# Patient Record
Sex: FEMALE | Race: BLACK OR AFRICAN AMERICAN
[De-identification: names, ages, dates, MRNs, and addresses within clinical notes are randomized per-mention and may not be internally consistent; named-entity substitution may affect disease eponyms.]

---

## 2020-06-23 ENCOUNTER — HOSPITAL ENCOUNTER (OUTPATIENT)
Dept: HOSPITAL 92 - BICULT | Age: 25
Discharge: HOME | End: 2020-06-23
Attending: OBSTETRICS & GYNECOLOGY
Payer: COMMERCIAL

## 2020-06-23 DIAGNOSIS — Z3A.21: ICD-10-CM

## 2020-06-23 DIAGNOSIS — Z34.82: Primary | ICD-10-CM

## 2020-06-23 PROCEDURE — 76805 OB US >/= 14 WKS SNGL FETUS: CPT

## 2020-06-23 NOTE — ULT
ULTRASOUND OBSTETRICAL COMPLETE:



DATE:

6/23/2020



HISTORY:

ICD-10: "Z 34.82 encounter for supervision of other normal pregnancy, second trimester"



FINDINGS:



Fetal number: morris

Fetal lie: Cephalic

Maternal cervix: 3.5 cm.  Closed.

Placenta: Anterior. No placenta previa. Placental lakes are noted.

Amniotic fluid volume: GALLITO = 13 cm

Fetal heart rate: 134 bpm



The following fetal anatomy is visualized, with no evidence of anomalies:



Head, cerebellum, lateral ventricles, four-chamber heart, stomach, kidneys, cord insertion, bladder, 
cervical spine, thoracic spine, lumbar spine, sacrum, nose and lips, upper extremities, lower

extremities, and three-vessel cord.



Fetal biometry:



Biparietal diameter (BPD): 5.0 cm   21 w    1 d

Head circumference (HC): 18.9 cm    21  w     2 d

Abdominal circumference (AC): 15.4 cm   20 w    5 d

Femur length (FL): 3.8 cm   22 w     1  d





Average ultrasound age (AUA):  21 w    3 d

Estimated date of delivery (CINTIA): 10/31/2020

Estimated fetal weight (EFW): 410 g +/- 60 g 



IMPRESSION:

1) Live 2nd  trimester intrauterine gestation.

2) Estimated gestational age of  21 weeks,  3 days

3) cephalic lie.

4) no fetal anatomic abnormality identified.



Reported By: Kristian Prakash 

Electronically Signed:  6/23/2020 2:31 PM

## 2022-06-02 ENCOUNTER — HOSPITAL ENCOUNTER (EMERGENCY)
Dept: HOSPITAL 92 - ERS | Age: 27
LOS: 1 days | Discharge: HOME | End: 2022-06-03
Payer: COMMERCIAL

## 2022-06-02 DIAGNOSIS — Z3A.20: ICD-10-CM

## 2022-06-02 DIAGNOSIS — O99.012: ICD-10-CM
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
## 2022-06-02 DIAGNOSIS — D57.1: ICD-10-CM

## 2022-06-02 DIAGNOSIS — S02.609A: ICD-10-CM

## 2022-06-02 DIAGNOSIS — O9A.212: Primary | ICD-10-CM

## 2022-06-02 PROCEDURE — 86901 BLOOD TYPING SEROLOGIC RH(D): CPT

## 2022-06-02 PROCEDURE — 70110 X-RAY EXAM OF JAW 4/> VIEWS: CPT

## 2022-06-02 PROCEDURE — 86900 BLOOD TYPING SEROLOGIC ABO: CPT

## 2022-06-02 PROCEDURE — 36415 COLL VENOUS BLD VENIPUNCTURE: CPT

## 2022-06-02 PROCEDURE — 99283 EMERGENCY DEPT VISIT LOW MDM: CPT

## 2022-06-02 PROCEDURE — 85025 COMPLETE CBC W/AUTO DIFF WBC: CPT

## 2022-06-03 LAB
BASOPHILS # BLD AUTO: 0 THOU/UL (ref 0–0.2)
BASOPHILS NFR BLD AUTO: 0.5 % (ref 0–1)
EOSINOPHIL # BLD AUTO: 0.2 THOU/UL (ref 0–0.7)
EOSINOPHIL NFR BLD AUTO: 2.7 % (ref 0–10)
HGB BLD-MCNC: 8.9 G/DL (ref 12–16)
LYMPHOCYTES # BLD: 2.2 THOU/UL (ref 1.2–3.4)
LYMPHOCYTES NFR BLD AUTO: 32.4 % (ref 21–51)
MCH RBC QN AUTO: 25.5 PG (ref 27–31)
MCV RBC AUTO: 80.1 FL (ref 78–98)
MONOCYTES # BLD AUTO: 0.7 THOU/UL (ref 0.11–0.59)
MONOCYTES NFR BLD AUTO: 9.8 % (ref 0–10)
NEUTROPHILS # BLD AUTO: 3.7 THOU/UL (ref 1.4–6.5)
NEUTROPHILS NFR BLD AUTO: 54.5 % (ref 42–75)
PLATELET # BLD AUTO: 225 THOU/UL (ref 130–400)
RBC # BLD AUTO: 3.49 MILL/UL (ref 4.2–5.4)
WBC # BLD AUTO: 6.9 THOU/UL (ref 4.8–10.8)

## 2022-06-14 ENCOUNTER — HOSPITAL ENCOUNTER (OUTPATIENT)
Dept: HOSPITAL 92 - BICULT | Age: 27
Discharge: HOME | End: 2022-06-14
Attending: OBSTETRICS & GYNECOLOGY
Payer: COMMERCIAL

## 2022-06-14 DIAGNOSIS — Z3A.21: ICD-10-CM

## 2022-06-14 DIAGNOSIS — Z34.82: Primary | ICD-10-CM

## 2022-06-14 PROCEDURE — 76805 OB US >/= 14 WKS SNGL FETUS: CPT

## 2022-10-13 ENCOUNTER — HOSPITAL ENCOUNTER (INPATIENT)
Dept: HOSPITAL 92 - CSHLD/OP | Age: 27
LOS: 2 days | Discharge: HOME | End: 2022-10-15
Attending: OBSTETRICS & GYNECOLOGY | Admitting: OBSTETRICS & GYNECOLOGY
Payer: COMMERCIAL

## 2022-10-13 VITALS — BODY MASS INDEX: 22.8 KG/M2

## 2022-10-13 DIAGNOSIS — O32.8XX0: Primary | ICD-10-CM

## 2022-10-13 DIAGNOSIS — Z3A.39: ICD-10-CM

## 2022-10-13 DIAGNOSIS — Z79.899: ICD-10-CM

## 2022-10-13 LAB
HBSAG INDEX: 0.19 S/CO (ref 0–0.99)
HGB BLD-MCNC: 9.8 G/DL (ref 12–15.5)
HGB BLD-MCNC: 9.9 G/DL (ref 12–15.5)
MCH RBC QN AUTO: 24.1 PG (ref 27–33)
MCH RBC QN AUTO: 25.4 PG (ref 27–33)
MCV RBC AUTO: 78.1 FL (ref 81.6–98.3)
MCV RBC AUTO: 78.4 FL (ref 81.6–98.3)
PLATELET # BLD AUTO: 164 10X3/UL (ref 150–450)
PLATELET # BLD AUTO: 207 10X3/UL (ref 150–450)
RBC # BLD AUTO: 3.89 10X6/UL (ref 3.9–5.03)
RBC # BLD AUTO: 4.06 10X6/UL (ref 3.9–5.03)
SYPHILIS ANTIBODY INDEX: 0.05 S/CO
WBC # BLD AUTO: 12.1 10X3/UL (ref 3.5–10.5)
WBC # BLD AUTO: 6.8 10X3/UL (ref 3.5–10.5)

## 2022-10-13 PROCEDURE — 36430 TRANSFUSION BLD/BLD COMPNT: CPT

## 2022-10-13 PROCEDURE — 51702 INSERT TEMP BLADDER CATH: CPT

## 2022-10-13 PROCEDURE — 85027 COMPLETE CBC AUTOMATED: CPT

## 2022-10-13 PROCEDURE — P9016 RBC LEUKOCYTES REDUCED: HCPCS

## 2022-10-13 PROCEDURE — 30233N1 TRANSFUSION OF NONAUTOLOGOUS RED BLOOD CELLS INTO PERIPHERAL VEIN, PERCUTANEOUS APPROACH: ICD-10-PCS | Performed by: OBSTETRICS & GYNECOLOGY

## 2022-10-13 PROCEDURE — 86850 RBC ANTIBODY SCREEN: CPT

## 2022-10-13 PROCEDURE — 0W3R7ZZ CONTROL BLEEDING IN GENITOURINARY TRACT, VIA NATURAL OR ARTIFICIAL OPENING: ICD-10-PCS | Performed by: OBSTETRICS & GYNECOLOGY

## 2022-10-13 PROCEDURE — 86900 BLOOD TYPING SEROLOGIC ABO: CPT

## 2022-10-13 PROCEDURE — 36415 COLL VENOUS BLD VENIPUNCTURE: CPT

## 2022-10-13 PROCEDURE — 99285 EMERGENCY DEPT VISIT HI MDM: CPT

## 2022-10-13 PROCEDURE — 86901 BLOOD TYPING SEROLOGIC RH(D): CPT

## 2022-10-13 PROCEDURE — 87340 HEPATITIS B SURFACE AG IA: CPT

## 2022-10-13 PROCEDURE — 86780 TREPONEMA PALLIDUM: CPT

## 2022-10-13 PROCEDURE — 30233K1 TRANSFUSION OF NONAUTOLOGOUS FROZEN PLASMA INTO PERIPHERAL VEIN, PERCUTANEOUS APPROACH: ICD-10-PCS | Performed by: OBSTETRICS & GYNECOLOGY

## 2022-10-13 PROCEDURE — P9059 PLASMA, FRZ BETWEEN 8-24HOUR: HCPCS

## 2022-10-13 RX ADMIN — Medication SCH ML: at 15:41

## 2022-10-13 RX ADMIN — Medication SCH ML: at 02:45

## 2022-10-14 LAB
HGB BLD-MCNC: 8.7 G/DL (ref 12–15.5)
MCH RBC QN AUTO: 25.3 PG (ref 27–33)
MCV RBC AUTO: 78.8 FL (ref 81.6–98.3)
PLATELET # BLD AUTO: 170 10X3/UL (ref 150–450)
RBC # BLD AUTO: 3.44 10X6/UL (ref 3.9–5.03)
WBC # BLD AUTO: 9.1 10X3/UL (ref 3.5–10.5)

## 2022-10-14 RX ADMIN — HYDROCODONE BITARTRATE AND ACETAMINOPHEN PRN TAB: 5; 325 TABLET ORAL at 17:57

## 2022-10-14 RX ADMIN — HYDROCODONE BITARTRATE AND ACETAMINOPHEN PRN TAB: 5; 325 TABLET ORAL at 12:46

## 2022-10-14 RX ADMIN — HYDROCODONE BITARTRATE AND ACETAMINOPHEN PRN TAB: 5; 325 TABLET ORAL at 03:52

## 2022-10-14 RX ADMIN — HYDROCODONE BITARTRATE AND ACETAMINOPHEN PRN TAB: 5; 325 TABLET ORAL at 07:44

## 2022-10-14 RX ADMIN — HYDROCODONE BITARTRATE AND ACETAMINOPHEN PRN TAB: 5; 325 TABLET ORAL at 23:16

## 2022-10-15 VITALS — TEMPERATURE: 98.7 F | DIASTOLIC BLOOD PRESSURE: 79 MMHG | SYSTOLIC BLOOD PRESSURE: 137 MMHG

## 2022-10-15 RX ADMIN — HYDROCODONE BITARTRATE AND ACETAMINOPHEN PRN TAB: 5; 325 TABLET ORAL at 09:01

## 2022-10-15 RX ADMIN — HYDROCODONE BITARTRATE AND ACETAMINOPHEN PRN TAB: 5; 325 TABLET ORAL at 03:41
